# Patient Record
Sex: FEMALE | Race: BLACK OR AFRICAN AMERICAN | NOT HISPANIC OR LATINO
[De-identification: names, ages, dates, MRNs, and addresses within clinical notes are randomized per-mention and may not be internally consistent; named-entity substitution may affect disease eponyms.]

---

## 2021-08-13 PROBLEM — Z00.00 ENCOUNTER FOR PREVENTIVE HEALTH EXAMINATION: Status: ACTIVE | Noted: 2021-08-13

## 2021-09-08 ENCOUNTER — NON-APPOINTMENT (OUTPATIENT)
Age: 51
End: 2021-09-08

## 2021-09-08 ENCOUNTER — APPOINTMENT (OUTPATIENT)
Dept: GYNECOLOGIC ONCOLOGY | Facility: CLINIC | Age: 51
End: 2021-09-08
Payer: COMMERCIAL

## 2021-09-08 VITALS
HEART RATE: 68 BPM | BODY MASS INDEX: 29.02 KG/M2 | SYSTOLIC BLOOD PRESSURE: 125 MMHG | DIASTOLIC BLOOD PRESSURE: 83 MMHG | OXYGEN SATURATION: 99 % | WEIGHT: 170 LBS | RESPIRATION RATE: 18 BRPM | HEIGHT: 64 IN | TEMPERATURE: 97.3 F

## 2021-09-08 DIAGNOSIS — Z78.9 OTHER SPECIFIED HEALTH STATUS: ICD-10-CM

## 2021-09-08 DIAGNOSIS — D21.9 BENIGN NEOPLASM OF CONNECTIVE AND OTHER SOFT TISSUE, UNSPECIFIED: ICD-10-CM

## 2021-09-08 PROCEDURE — 99204 OFFICE O/P NEW MOD 45 MIN: CPT

## 2021-09-08 NOTE — PAST MEDICAL HISTORY
[Menstruating] : The patient is menstruating [Definite ___ (Date)] : the last menstrual period was [unfilled] [Total Preg ___] : G[unfilled] [Live Births ___] : P[unfilled]

## 2021-09-27 NOTE — ASSESSMENT
[FreeTextEntry1] : Patient would not like a hysterectomy at this time. Plan is for patient to follow up with her own gynecologist in 3 months who will send her for an MRI at that time.

## 2021-09-27 NOTE — DISCUSSION/SUMMARY
[FreeTextEntry1] : I discussed with the patient with the aid of diagrams, reviewed the findings on history and physical examination, and reviewed the imaging studies in detail. We discussed my impression that her intrauterine masses are benign leiomyoma. We also discussed the possibility of atypical leiomyomas and sarcoma. The risk of sarcoma in patients presenting for fibroid surgery is between 1:350-1:1000. \par 	\par Medical vs. surgical management of fibroids discussed. Medical management includes hormonal therapies. Since many of these are effectively contraceptive options, they are not a great choice for patients attempting pregnancy. Uterine artery embolization is another non-surgical option, however can also result in decreased fertility. Surgical options include hysterectomy and myomectomy. While hysterectomy represents definitive management, myomectomy is the surgical option for patients who desire future fertility. The risk of the fibroids “growing back” after myomectomy was also discussed.\par Cyst\par \par Surgical approach of hysterectomy also discussed- including minimally invasive and open approaches. Minimally invasive approach will be attempted if appropriate, however if the size of the uterus/fibroids exceed that which is appropriate laparoscopically, an open abdominal approach will be selected.\par \par Complications that include, but are not limited to: bleeding, infection, injury to other organs including bowel, bladder, ureters, blood vessels, nerves; infections, blood clots, lymphedema, pneumonia, wound complications and prolonged hospital stay have all been discussed with the patient. Whenever minimally invasive surgery is attempted, there is a chance of needing to convert to laparotomy. The risk of occult injury requiring additional surgery also discussed. I have also provided her with the diagrams.  \par \par Patient does not desire surgical intervention at this time\par patient should at least have MRI surveillance in 3 months \par Will follow up with Dr. Cottrell for fibroid management\par \par The total encounter time was 60 minutes, of which over 50% was spent face-to-face, examining and counseling the patient, or coordinating care.  \par

## 2021-09-27 NOTE — PHYSICAL EXAM
[Normal] : Recto-Vaginal Exam: Normal [Abnormal] : Uterus: Abnormal [de-identified] : enlarged 30cm, firm, mobile [de-identified] : unable to palpate

## 2021-09-27 NOTE — HISTORY OF PRESENT ILLNESS
[FreeTextEntry1] : Problem\par 1) Fibroid Uterus\par \par Previous Therapy\par 1) CTAP 8/11/21\par    a) Uterus 27cm with 18cm fibroid, 19cm fibroid 13cm fibroid, ovaries not identified, mild to moderate ascites\par    b) *soft tissue density of R breast\par \par \par 52 yo referred by Dr. Cottrell for enlarged fibroid uterus. Patient reports that starting in March 2020 she started to notice enlargement of her abdomen which she attributed to weight gain during COVID. Starting a month and a half ago, she started to notice daily discharge which was sometimes bloody. She went to see a gynecologist in August, which was her first time seeing a gynecolgoist in over 10 years. Dr. Cottrell sent her for CT abd/pelvis which showed the above findings. Today she states she feels the "mass" has gotten smaller. She denies urinary frequency, painful urination, constipation, or urinary urgency. She reports that on the day and a half of her period she has to change a super tampon every 2 hours and sometimes takes iron due to the heavy bleeding. The patient is fully vaccinated against COVID-19 and would like to discuss her treatment options today. \par \par Gyn Hx: Age of first menstrual period 14, 28 days between menses, duration 5-6 days, LMP 8/17/21, no hx of abnormal pap, cysts, or STI's, used OCP starting at 18 for 10 years\par Obhx: 1 pregnancy, VTOP at 6 weeks\par Surg Hx: none\par PMH: none\par Meds: OTC iron\par All: none\par Social: lives in [place] with her boyfriend, works as a  in [place], drinks 1x/week, does not smoke or use drugs\par Fam hx: none

## 2023-01-12 ENCOUNTER — RESULT REVIEW (OUTPATIENT)
Age: 53
End: 2023-01-12

## 2023-01-12 ENCOUNTER — APPOINTMENT (OUTPATIENT)
Dept: SURGICAL ONCOLOGY | Facility: CLINIC | Age: 53
End: 2023-01-12
Payer: COMMERCIAL

## 2023-01-12 DIAGNOSIS — D24.1 BENIGN NEOPLASM OF RIGHT BREAST: ICD-10-CM

## 2023-01-12 PROCEDURE — 99205 OFFICE O/P NEW HI 60 MIN: CPT | Mod: 25

## 2023-01-12 PROCEDURE — 19083 BX BREAST 1ST LESION US IMAG: CPT

## 2023-01-31 ENCOUNTER — NON-APPOINTMENT (OUTPATIENT)
Age: 53
End: 2023-01-31

## 2023-01-31 PROBLEM — D24.1 BREAST FIBROADENOMA, RIGHT: Status: ACTIVE | Noted: 2023-01-31
